# Patient Record
Sex: MALE | Race: WHITE | NOT HISPANIC OR LATINO | Employment: UNEMPLOYED | URBAN - METROPOLITAN AREA
[De-identification: names, ages, dates, MRNs, and addresses within clinical notes are randomized per-mention and may not be internally consistent; named-entity substitution may affect disease eponyms.]

---

## 2018-11-17 ENCOUNTER — HOSPITAL ENCOUNTER (EMERGENCY)
Facility: HOSPITAL | Age: 48
Discharge: HOME | End: 2018-11-17
Attending: EMERGENCY MEDICINE

## 2018-11-17 VITALS
BODY MASS INDEX: 25.77 KG/M2 | HEIGHT: 70 IN | DIASTOLIC BLOOD PRESSURE: 76 MMHG | WEIGHT: 180 LBS | RESPIRATION RATE: 18 BRPM | OXYGEN SATURATION: 100 % | SYSTOLIC BLOOD PRESSURE: 124 MMHG | HEART RATE: 115 BPM | TEMPERATURE: 97.4 F

## 2018-11-17 DIAGNOSIS — F32.A DEPRESSION: Primary | ICD-10-CM

## 2018-11-17 DIAGNOSIS — Z59.00 HOMELESSNESS: ICD-10-CM

## 2018-11-17 PROCEDURE — 99284 EMERGENCY DEPT VISIT MOD MDM: CPT

## 2018-11-17 SDOH — ECONOMIC STABILITY - HOUSING INSECURITY: HOMELESSNESS UNSPECIFIED: Z59.00

## 2018-11-17 NOTE — ED NOTES
Patient left the ED prior to the completion of his referral to crisis residence being completed  Patient has no contact phone for follow-up at this time, but was made aware that he could return if further assistance was needed       GOPAL Sanchez  11/17/18   2194

## 2018-11-17 NOTE — ED NOTES
Patient is a 57 y o  male who was brought to the ED with increased depression and anxiety related to being homeless for the past 6 months  Patient has been seperated from his wife for a little over 6 months; he was living with his parents for awhile, however after an argument with his father he had to leave  Patient has been living in his car for the past 6 months, and drove here from [facility] to start over  Patient denies having any other family or supports to assist him at this time  Patient identifies that his anxiety and depression are worsening due to his stress levels, however, does not know where to start to improve things  Patient does not have insurance and has been taking his medications sparingly in order to make them last longer  Patient has been sleeping less because of the cold weather and has been struggling to find food so has gone two days without eating  Patient is active with outpatient psychiatry in [facility], however he has not been there in over 3 months  He does not have a scheduled appointment and was informed to call when he needs an appointment  Patient expressed that he currently receives a pension payment monthly which has been aiding in his ability to pay for gas and get necessities  Patient denies suicidal/homicidal ideations and auditory/visual hallucinations  Patient is agreeable for a referral to be sent to the crisis residence for possible admission once a bed becomes available  He was also provided with a list of shelters and outpatient [place] [address] resources       Chief Complaint   Patient presents with    Anxiety     states increased stress at home, denies HI/SI, see's psychiatrist and prescribed meds for depression    Depression     Intake Assessment completed, Safety risk Assessment completed    GOPAL Wagner  11/17/18   2219

## 2018-11-17 NOTE — ED NOTES
Pt c/o increased stress and anxiety despite taking prescribed medication this morning; pt notes he is homeless and has not had any nourishment x 2 days; meal provided    Denies suicidal ideation now and in the past     Marissa Cutler RN  11/17/18 8817 LEXI Cruz RN  11/17/18 9141 Adams Al RN  11/17/18 1212

## 2018-11-17 NOTE — DISCHARGE INSTRUCTIONS
Depression, Ambulatory Care   GENERAL INFORMATION:   Depression  is a medical condition that causes feelings of sadness or hopelessness that do not go away  Depression may cause you to lose interest in things you used to enjoy  These feelings may interfere with your daily life  Common symptoms include the following:   · Appetite changes, or weight gain or loss    · Trouble going to sleep or staying asleep, or sleeping too much    · Fatigue or lack of energy    · Feeling restless, irritable, or withdrawn    · Feeling worthless, hopeless, discouraged, or very guilty    · Trouble concentrating, remembering things, doing daily tasks, or making decisions    · Thoughts about hurting or killing yourself  Seek immediate care for the following symptoms:   · You think about harming yourself or someone else  Treatment for depression  may include medicine to improve or balance your mood  Therapy may also be used to treat your depression  A therapist will help you learn to cope with your thoughts and feelings  Therapy can be done alone or in a group  It may also be done with family members or a significant other  Manage depression:   · Get regular physical activity  Try to exercise for 30 minutes, 3 to 5 days a week  Work with your healthcare provider to develop an exercise plan that you enjoy  · Get enough sleep  Create a routine to help you relax before bed  Try to go to bed and wake up at the same time every day  Sleep is important for emotional health  · Eat a variety of healthy foods  Healthy foods include fruits, vegetables, whole-grain breads, low-fat dairy products, beans, lean meats, and fish  A healthy meal plan is low in fat, salt, and added sugar  · Avoid or limit alcohol  Ask your healthcare provider how much alcohol is safe for you to drink  A drink of alcohol is 12 ounces of beer, 5 ounces of wine, or 1½ ounces of liquor  Follow up with your healthcare provider as directed:   You will need to return so your healthcare provider can monitor your progress  Write down your questions so you remember to ask them during your visits  CARE AGREEMENT:   You have the right to help plan your care  Learn about your health condition and how it may be treated  Discuss treatment options with your caregivers to decide what care you want to receive  You always have the right to refuse treatment  The above information is an  only  It is not intended as medical advice for individual conditions or treatments  Talk to your doctor, nurse or pharmacist before following any medical regimen to see if it is safe and effective for you  © 2014 [address] is for End User's use only and may not be sold, redistributed or otherwise used for commercial purposes  All illustrations and images included in CareNotes® are the copyrighted property of A D A M , Inc  or Hermes Lentz

## 2018-11-28 NOTE — ED PROVIDER NOTES
History  Chief Complaint   Patient presents with    Anxiety     states increased stress at home, denies HI/SI, see's psychiatrist and prescribed meds for depression    Depression       History provided by:  Patient  Anxiety   Presenting symptoms: depression    Presenting symptoms: no aggressive behavior, no agitation, no bizarre behavior, no delusions, no disorganized speech, no hallucinations, no homicidal ideas, no paranoid behavior, no suicidal thoughts, no suicidal threats and no suicide attempt    Degree of incapacity (severity):  Mild  Onset quality:  Gradual  Duration:  6 months  Timing:  Constant  Progression:  Worsening  Chronicity:  New  Context: stressful life event (Pt is now homeless for the last 6 months, lived in [place] and was  but is now getting  so has no where to live so is living in his car)    Context: not alcohol use, not drug abuse, not medication and not noncompliant    Treatment compliance:  Most of the time (had medications for depression and anxiety, but they were in [place], came here and has not had any resources)  Relieved by:   Antidepressants  Worsened by:  Lack of sleep and family interactions  Ineffective treatments:  None tried  Associated symptoms: anxiety and appetite change (he is hungry which is a big stresser, he hasn't eaten in two days, once he got a meal he states he is already feelings better)    Associated symptoms: no abdominal pain, no anhedonia, no chest pain, no decreased need for sleep, no fatigue, no headaches, no hypersomnia, no insomnia, no irritability and no poor judgment    Risk factors: hx of mental illness    Risk factors: no hx of suicide attempts and no recent psychiatric admission    Depression   Presenting symptoms: depression    Presenting symptoms: no aggressive behavior, no agitation, no bizarre behavior, no delusions, no disorganized speech, no hallucinations, no homicidal ideas, no paranoid behavior, no suicidal thoughts, no suicidal threats and no suicide attempt    Associated symptoms: anxiety and appetite change (he is hungry which is a big stresser, he hasn't eaten in two days, once he got a meal he states he is already feelings better)    Associated symptoms: no abdominal pain, no anhedonia, no chest pain, no decreased need for sleep, no fatigue, no headaches, no hypersomnia, no insomnia, no irritability and no poor judgment        None       Past Medical History:   Diagnosis Date    Anxiety     Depression        History reviewed  No pertinent surgical history  History reviewed  No pertinent family history  I have reviewed and agree with the history as documented  Social History   Substance Use Topics    Smoking status: Current Every Day Smoker     Packs/day: 1 00     Types: Cigarettes    Smokeless tobacco: Never Used    Alcohol use No        Review of Systems   Constitutional: Positive for appetite change (he is hungry which is a big stresser, he hasn't eaten in two days, once he got a meal he states he is already feelings better)  Negative for fatigue and irritability  Cardiovascular: Negative for chest pain  Gastrointestinal: Negative for abdominal pain  Neurological: Negative for headaches  Psychiatric/Behavioral: Positive for depression  Negative for agitation, hallucinations, homicidal ideas, paranoia and suicidal ideas  The patient is nervous/anxious  The patient does not have insomnia  All other systems reviewed and are negative  Physical Exam  Physical Exam   Constitutional: He is oriented to person, place, and time  He appears well-developed and well-nourished  No distress  HENT:   Head: Normocephalic and atraumatic  Mouth/Throat: Oropharynx is clear and moist    Eyes: Pupils are equal, round, and reactive to light  EOM are normal    Neck: Normal range of motion  Neck supple  Cardiovascular: Regular rhythm and normal heart sounds  Tachycardia present      Pulmonary/Chest: Effort normal and breath sounds normal  No respiratory distress  He has no wheezes  Abdominal: Soft  Bowel sounds are normal  He exhibits no distension  There is no tenderness  Musculoskeletal: Normal range of motion  Neurological: He is alert and oriented to person, place, and time  No cranial nerve deficit  He exhibits normal muscle tone  Skin: Skin is warm and dry  He is not diaphoretic  Psychiatric: He exhibits a depressed mood  Nursing note and vitals reviewed  Vital Signs  ED Triage Vitals [11/17/18 1133]   Temperature Pulse Respirations Blood Pressure SpO2   (!) 97 4 °F (36 3 °C) (!) 115 18 124/76 100 %      Temp Source Heart Rate Source Patient Position - Orthostatic VS BP Location FiO2 (%)   Oral Monitor Sitting Right arm --      Pain Score       No Pain           Vitals:    11/17/18 1133   BP: 124/76   Pulse: (!) 115   Patient Position - Orthostatic VS: Sitting       Visual Acuity      ED Medications  Medications - No data to display    Diagnostic Studies  Results Reviewed     None                 No orders to display              Procedures  Procedures       Phone Contacts  ED Phone Contact    ED Course  ED Course as of Nov 27 2134   Sat Nov 17, 2018   1244 Pt seen and evaluated by crisis   Given local resources                                MDM  Number of Diagnoses or Management Options  Depression: new and requires workup  Homelessness: new and requires workup     Amount and/or Complexity of Data Reviewed  Discuss the patient with other providers: yes (crisis)    Risk of Complications, Morbidity, and/or Mortality  Presenting problems: moderate    Patient Progress  Patient progress: improved    CritCare Time    Disposition  Final diagnoses:   Depression   Homelessness     Time reflects when diagnosis was documented in both MDM as applicable and the Disposition within this note     Time User Action Codes Description Comment    11/17/2018 12:49 PM Dave Beltre [F32 9] Depression     11/17/2018 12:49 PM Arnav [address] [Z59 0] Homelessness       ED Disposition     ED Disposition Condition Comment    Discharge  Dana Hurley discharge to home/self care  Condition at discharge: Good        MD Documentation      Most Recent Value   Sending MD Dr Bindu Guerra up With Specialties Details Why Contact Info Additional Information    [address] [facility] Emergency Department Emergency Medicine  If symptoms worsen [address] [facility] [address], [address], [place], [facility]    With the resources crisis gave you  There are no discharge medications for this patient  No discharge procedures on file      ED Provider  Electronically Signed by           Matthew Munson MD  11/27/18 2008